# Patient Record
Sex: MALE | URBAN - METROPOLITAN AREA
[De-identification: names, ages, dates, MRNs, and addresses within clinical notes are randomized per-mention and may not be internally consistent; named-entity substitution may affect disease eponyms.]

---

## 2019-04-08 ENCOUNTER — OFFICE VISIT CONVERTED (OUTPATIENT)
Dept: UROLOGY | Facility: CLINIC | Age: 69
End: 2019-04-08
Attending: UROLOGY

## 2022-06-08 ENCOUNTER — HOSPITAL ENCOUNTER (EMERGENCY)
Dept: HOSPITAL 49 - FER | Age: 72
Discharge: OTHER INSTITUTION | End: 2022-06-08
Payer: COMMERCIAL

## 2022-06-08 VITALS — WEIGHT: 230 LBS | BODY MASS INDEX: 34.86 KG/M2 | HEIGHT: 68 IN

## 2022-06-08 DIAGNOSIS — Z20.822: ICD-10-CM

## 2022-06-08 DIAGNOSIS — F17.210: ICD-10-CM

## 2022-06-08 DIAGNOSIS — I21.4: Primary | ICD-10-CM

## 2022-06-08 DIAGNOSIS — E11.9: ICD-10-CM

## 2022-06-08 LAB
ALBUMIN SERPL-MCNC: 3.6 G/DL (ref 3.4–5)
ALKALINE PHOSHATASE: 62 U/L (ref 46–116)
ALT SERPL-CCNC: 29 U/L (ref 16–63)
AST: 23 U/L (ref 15–37)
BASOPHIL: 0.2 % (ref 0–2)
BILIRUBIN - TOTAL: 0.6 MG/DL (ref 0.2–1)
BUN SERPL-MCNC: 16 MG/DL (ref 7–18)
BUN/CREAT RATIO (CALC): 18 RATIO
CHLORIDE: 107 MMOL/L (ref 98–107)
CO2 (BICARBONATE): 28 MMOL/L (ref 21–32)
CREATININE: 0.89 MG/DL (ref 0.67–1.17)
EOSINOPHIL: 2.4 % (ref 0–7)
GLOBULIN (CALCULATION): 3.4 G/DL
GLUCOSE SERPL-MCNC: 136 MG/DL (ref 74–106)
HCT: 40.9 % (ref 42–52)
HGB BLD-MCNC: 12.7 G/DL (ref 13.2–18)
INR PPP: 1.08 (ref 0.9–1.2)
LYMPHOCYTE: 2.5 % (ref 15–48)
MCH RBC QN AUTO: 28.6 PG (ref 25–31)
MCHC RBC AUTO-ENTMCNC: 31.1 G/DL (ref 32–36)
MCV: 92.1 FL (ref 78–100)
MONOCYTE: 3.7 % (ref 0–12)
MPV: 11.1 FL (ref 6–9.5)
NEUTROPHIL: 90.6 % (ref 41–80)
NRBC: 0
PLT: 316 K/UL (ref 150–400)
POTASSIUM: 3 MMOL/L (ref 3.5–5.1)
PROTHROMBIN TIME: 13.4 SECONDS (ref 11.8–13.4)
PTT: 30.5 SECONDS (ref 24.4–34.7)
RBC MORPHOLOGY: NORMAL
RBC: 4.44 M/UL (ref 4.7–6)
RDW: 14.1 % (ref 11.5–14)
TOTAL PROTEIN: 7 G/DL (ref 6.4–8.2)
WBC: 14.5 K/UL (ref 4–10.5)

## 2022-06-08 PROCEDURE — U0002 COVID-19 LAB TEST NON-CDC: HCPCS

## 2024-05-02 PROBLEM — R97.20 ELEVATED PSA: Status: ACTIVE | Noted: 2024-05-02

## 2024-05-06 ENCOUNTER — LAB (OUTPATIENT)
Dept: LAB | Facility: HOSPITAL | Age: 74
End: 2024-05-06
Payer: MEDICARE

## 2024-05-06 ENCOUNTER — OFFICE VISIT (OUTPATIENT)
Dept: UROLOGY | Facility: CLINIC | Age: 74
End: 2024-05-06
Payer: MEDICARE

## 2024-05-06 DIAGNOSIS — R97.20 ELEVATED PSA: Primary | ICD-10-CM

## 2024-05-06 DIAGNOSIS — N40.1 BENIGN PROSTATIC HYPERPLASIA WITH LOWER URINARY TRACT SYMPTOMS, SYMPTOM DETAILS UNSPECIFIED: ICD-10-CM

## 2024-05-06 DIAGNOSIS — R97.20 ELEVATED PSA: ICD-10-CM

## 2024-05-06 LAB — SPECIMEN VOL 24H UR: NORMAL L

## 2024-05-06 PROCEDURE — 51798 US URINE CAPACITY MEASURE: CPT | Performed by: UROLOGY

## 2024-05-06 PROCEDURE — 1160F RVW MEDS BY RX/DR IN RCRD: CPT | Performed by: UROLOGY

## 2024-05-06 PROCEDURE — 1159F MED LIST DOCD IN RCRD: CPT | Performed by: UROLOGY

## 2024-05-06 PROCEDURE — 36415 COLL VENOUS BLD VENIPUNCTURE: CPT

## 2024-05-06 PROCEDURE — 99204 OFFICE O/P NEW MOD 45 MIN: CPT | Performed by: UROLOGY

## 2024-05-06 PROCEDURE — 84153 ASSAY OF PSA TOTAL: CPT

## 2024-05-06 RX ORDER — GEMFIBROZIL 600 MG/1
TABLET, FILM COATED ORAL
COMMUNITY

## 2024-05-06 RX ORDER — GLIPIZIDE 10 MG/1
TABLET ORAL
COMMUNITY

## 2024-05-06 RX ORDER — POTASSIUM CHLORIDE 750 MG/1
10 TABLET, EXTENDED RELEASE ORAL
COMMUNITY
Start: 2023-12-27

## 2024-05-06 RX ORDER — INSULIN GLARGINE 100 [IU]/ML
INJECTION, SOLUTION SUBCUTANEOUS
COMMUNITY
Start: 2024-04-02

## 2024-05-06 RX ORDER — PEN NEEDLE, DIABETIC 32GX 5/32"
NEEDLE, DISPOSABLE MISCELLANEOUS SEE ADMIN INSTRUCTIONS
COMMUNITY
Start: 2024-04-11

## 2024-05-06 RX ORDER — ATENOLOL 50 MG/1
TABLET ORAL
COMMUNITY

## 2024-05-06 RX ORDER — HYDROCODONE BITARTRATE AND ACETAMINOPHEN 5; 325 MG/1; MG/1
1 TABLET ORAL
COMMUNITY
Start: 2024-04-01

## 2024-05-06 RX ORDER — FUROSEMIDE 20 MG/1
20 TABLET ORAL DAILY
COMMUNITY
Start: 2023-05-17

## 2024-05-06 RX ORDER — NAPROXEN 500 MG/1
500 TABLET ORAL
COMMUNITY
Start: 2024-04-29

## 2024-05-06 RX ORDER — SEMAGLUTIDE 1.34 MG/ML
1 INJECTION, SOLUTION SUBCUTANEOUS
COMMUNITY
Start: 2024-04-01

## 2024-05-06 RX ORDER — LOSARTAN POTASSIUM 100 MG/1
TABLET ORAL
COMMUNITY
Start: 2024-04-09

## 2024-05-06 RX ORDER — AMLODIPINE BESYLATE 10 MG/1
10 TABLET ORAL DAILY
COMMUNITY
Start: 2023-12-27 | End: 2024-12-26

## 2024-05-06 RX ORDER — FINASTERIDE 5 MG/1
5 TABLET, FILM COATED ORAL DAILY
Qty: 90 TABLET | Refills: 4 | Status: SHIPPED | OUTPATIENT
Start: 2024-05-06

## 2024-05-06 RX ORDER — ASPIRIN 81 MG/1
81 TABLET ORAL DAILY
COMMUNITY

## 2024-05-06 RX ORDER — NITROGLYCERIN 0.4 MG/1
0.4 TABLET SUBLINGUAL
COMMUNITY

## 2024-05-06 RX ORDER — ATORVASTATIN CALCIUM 20 MG/1
TABLET, FILM COATED ORAL
COMMUNITY

## 2024-05-07 LAB — PSA SERPL-MCNC: 5.19 NG/ML (ref 0–4)

## 2024-05-15 ENCOUNTER — TELEPHONE (OUTPATIENT)
Dept: UROLOGY | Facility: CLINIC | Age: 74
End: 2024-05-15
Payer: MEDICARE

## 2024-05-15 NOTE — TELEPHONE ENCOUNTER
PT WIFE CALLED BACK TO RESCHEDULE.  UNABLE TO WARM CENTENO TO NON -CLINICAL.  PLEASE CALL BACK TO RESCHEDULE AFTER MRI.

## 2024-06-12 ENCOUNTER — TELEPHONE (OUTPATIENT)
Dept: UROLOGY | Facility: CLINIC | Age: 74
End: 2024-06-12
Payer: MEDICARE

## 2024-06-12 NOTE — TELEPHONE ENCOUNTER
Notified pt spouse of pre MRI prep. NPO 6h prior. 1 gas x tab night before and morning of exam. She verbalized understanding.

## 2024-06-13 ENCOUNTER — HOSPITAL ENCOUNTER (OUTPATIENT)
Facility: HOSPITAL | Age: 74
Discharge: HOME | End: 2024-06-13
Admitting: UROLOGY
Payer: MEDICARE

## 2024-06-13 DIAGNOSIS — R97.20 ELEVATED PSA: ICD-10-CM

## 2024-06-13 PROCEDURE — 0 GADOBENATE DIMEGLUMINE 529 MG/ML SOLUTION: Performed by: UROLOGY

## 2024-06-13 PROCEDURE — 72197 MRI PELVIS W/O & W/DYE: CPT

## 2024-06-13 PROCEDURE — A9577 INJ MULTIHANCE: HCPCS | Performed by: UROLOGY

## 2024-06-13 RX ADMIN — GADOBENATE DIMEGLUMINE 20 ML: 529 INJECTION, SOLUTION INTRAVENOUS at 17:48

## 2024-06-18 ENCOUNTER — TELEPHONE (OUTPATIENT)
Dept: UROLOGY | Facility: CLINIC | Age: 74
End: 2024-06-18

## 2024-06-18 NOTE — TELEPHONE ENCOUNTER
----- Message from John GERARD sent at 6/18/2024  7:54 AM EDT -----  MRI still not read. We need to move his appt to 6/24 at 1:30

## 2024-06-18 NOTE — TELEPHONE ENCOUNTER
CALLED AND SPOKE TO PT WIFE, SHE WAS REQ A LATER TIME OR TUES APPT, PER MAYTE NOTHING AVAILABLE, CALLED WIFE BACK AND LEFT A DETAILED MESSAGE ON HER MACHINE THAT NOTHING ELSE AVAILABLE AND THAT I MOVED PT APPT TO 6/24/24 AT 1:30 PER GREGORY.

## 2024-06-20 ENCOUNTER — TELEPHONE (OUTPATIENT)
Dept: UROLOGY | Facility: CLINIC | Age: 74
End: 2024-06-20
Payer: MEDICARE

## 2024-06-20 NOTE — TELEPHONE ENCOUNTER
Spoke to Cleopatra in radiology she is going to try to get MRI bumped up on the list and resulted today.

## 2024-06-21 NOTE — PROGRESS NOTES
Chief Complaint: Urologic complaint    Subjective         History of Present Illness  Renato Alvarez is a 74 y.o. male         Elevated PSA  BPH      5/24 started finasteride 5 mg daily.  Helping, [employer].  Nocturia better      History of CABG and MI, carries nitroglycerin  Occasional smoker.  ASA 81       PVR    5/24   037      Previous    2 episodes of nephrolithiasis.     mother with kidney cancer.    Brother with prostate cancer.    No history of urologic surgery.          PSA -on finasteride since 5/24 6/24 MRI prostate - 79 g, PSAd 0.12 -some prostatitis, negative otherwise  5/24     5.1     3/24     11.7  2/19     4.5    Results for orders placed or performed in visit on 05/06/24   PSA DIAGNOSTIC    Specimen: Blood   Result Value Ref Range    PSA 5.190 (H) 0.000 - 4.000 ng/mL           Objective     No past medical history on file.    No past surgical history on file.      Current Outpatient Medications:     amLODIPine (NORVASC) 10 MG tablet, Take 1 tablet by mouth Daily., Disp: , Rfl:     aspirin 81 MG EC tablet, Take 1 tablet by mouth Daily., Disp: , Rfl:     atenolol (TENORMIN) 50 MG tablet, atenolol 50 mg oral tablet take 1 tablet (50 mg) by oral route once daily   Active, Disp: , Rfl:     atorvastatin (LIPITOR) 20 MG tablet, atorvastatin 20 mg oral tablet take 1 tablet (20 mg) by oral route once daily for 30 days   Active, Disp: , Rfl:     BD Pen Needle Rachael 2nd Gen 32G X 4 MM misc, See Admin Instructions. use with insulin pen, Disp: , Rfl:     finasteride (PROSCAR) 5 MG tablet, Take 1 tablet by mouth Daily., Disp: 90 tablet, Rfl: 4    furosemide (LASIX) 20 MG tablet, Take 1 tablet by mouth Daily., Disp: , Rfl:     gemfibrozil (LOPID) 600 MG tablet, gemfibrozil 600 mg oral tablet take 1 tablet (600 mg) by oral route 2 times per day 30 minutes before morning and evening meal   Active, Disp: , Rfl:     glipizide (GLUCOTROL) 10 MG tablet, , Disp: , Rfl:     HYDROcodone-acetaminophen (NORCO) 5-325  MG per tablet, Take 1 tablet by mouth., Disp: , Rfl:     Lantus SoloStar 100 UNIT/ML injection pen, ADMINISTER 20 UNITS UNDER THE SKIN EVERY MORNING, Disp: , Rfl:     losartan (COZAAR) 100 MG tablet, TAKE 1 TABLET(100 MG) BY MOUTH DAILY, Disp: , Rfl:     naproxen (NAPROSYN) 500 MG tablet, Take 1 tablet by mouth., Disp: , Rfl:     nitroglycerin (NITROSTAT) 0.4 MG SL tablet, Place 1 tablet under the tongue., Disp: , Rfl:     Ozempic, 1 MG/DOSE, 4 MG/3ML solution pen-injector, Inject 1 mg under the skin into the appropriate area as directed., Disp: , Rfl:     potassium chloride (KLOR-CON M10) 10 MEQ CR tablet, Take 1 tablet by mouth., Disp: , Rfl:     Allergies   Allergen Reactions    Ace Inhibitors Other (See Comments) and Swelling     Angioedema    Grapefruit Unknown - Low Severity     Grapefruit    Tuberculin Tests Unknown - Low Severity        No family history on file.             Assessment and Plan    Diagnoses and all orders for this visit:    1. Benign prostatic hyperplasia with lower urinary tract symptoms, symptom details unspecified (Primary)    2. Elevated PSA        Elevated PSA      PSA lower, PSA density within normal limits.  MRI negative.  Show some signs of prostatitis at this time patient given reassurance can have yearly PSA for a few years before stopping further screening.          BPH      Continue finasteride 5 mg daily.    Not currently interested in procedures      Follow-up with NP in [place] in 1 year with PSA before

## 2024-06-24 ENCOUNTER — OFFICE VISIT (OUTPATIENT)
Dept: UROLOGY | Facility: CLINIC | Age: 74
End: 2024-06-24
Payer: MEDICARE

## 2024-06-24 VITALS — HEIGHT: 69 IN | WEIGHT: 228 LBS | BODY MASS INDEX: 33.77 KG/M2

## 2024-06-24 DIAGNOSIS — R97.20 ELEVATED PSA: ICD-10-CM

## 2024-06-24 DIAGNOSIS — N40.1 BENIGN PROSTATIC HYPERPLASIA WITH LOWER URINARY TRACT SYMPTOMS, SYMPTOM DETAILS UNSPECIFIED: Primary | ICD-10-CM

## 2024-06-24 PROCEDURE — 1160F RVW MEDS BY RX/DR IN RCRD: CPT | Performed by: UROLOGY

## 2024-06-24 PROCEDURE — 99213 OFFICE O/P EST LOW 20 MIN: CPT | Performed by: UROLOGY

## 2024-06-24 PROCEDURE — 1159F MED LIST DOCD IN RCRD: CPT | Performed by: UROLOGY

## 2024-06-24 RX ORDER — FINASTERIDE 5 MG/1
5 TABLET, FILM COATED ORAL DAILY
Qty: 90 TABLET | Refills: 4 | Status: SHIPPED | OUTPATIENT
Start: 2024-06-24